# Patient Record
Sex: FEMALE | Race: ASIAN | NOT HISPANIC OR LATINO | URBAN - METROPOLITAN AREA
[De-identification: names, ages, dates, MRNs, and addresses within clinical notes are randomized per-mention and may not be internally consistent; named-entity substitution may affect disease eponyms.]

---

## 2018-06-19 ENCOUNTER — OFFICE VISIT - HEALTHEAST (OUTPATIENT)
Dept: FAMILY MEDICINE | Facility: CLINIC | Age: 63
End: 2018-06-19

## 2018-06-19 DIAGNOSIS — R60.0 LOWER EXTREMITY EDEMA: ICD-10-CM

## 2018-06-19 LAB
ALBUMIN SERPL-MCNC: 3.7 G/DL (ref 3.5–5)
ALP SERPL-CCNC: 127 U/L (ref 45–120)
ALT SERPL W P-5'-P-CCNC: 36 U/L (ref 0–45)
ANION GAP SERPL CALCULATED.3IONS-SCNC: 8 MMOL/L (ref 5–18)
AST SERPL W P-5'-P-CCNC: 23 U/L (ref 0–40)
BASOPHILS # BLD AUTO: 0 THOU/UL (ref 0–0.2)
BASOPHILS NFR BLD AUTO: 0 % (ref 0–2)
BILIRUB SERPL-MCNC: 0.9 MG/DL (ref 0–1)
BNP SERPL-MCNC: 50 PG/ML (ref 0–103)
BUN SERPL-MCNC: 9 MG/DL (ref 8–22)
CALCIUM SERPL-MCNC: 9.5 MG/DL (ref 8.5–10.5)
CHLORIDE BLD-SCNC: 106 MMOL/L (ref 98–107)
CO2 SERPL-SCNC: 25 MMOL/L (ref 22–31)
CREAT SERPL-MCNC: 0.71 MG/DL (ref 0.6–1.1)
D DIMER PPP FEU-MCNC: <0.27 FEU UG/ML
EOSINOPHIL # BLD AUTO: 0.1 THOU/UL (ref 0–0.4)
EOSINOPHIL NFR BLD AUTO: 2 % (ref 0–6)
ERYTHROCYTE [DISTWIDTH] IN BLOOD BY AUTOMATED COUNT: 10.5 % (ref 11–14.5)
GFR SERPL CREATININE-BSD FRML MDRD: >60 ML/MIN/1.73M2
GLUCOSE BLD-MCNC: 146 MG/DL (ref 70–125)
HCT VFR BLD AUTO: 43.6 % (ref 35–47)
HGB BLD-MCNC: 14.5 G/DL (ref 12–16)
LYMPHOCYTES # BLD AUTO: 1.7 THOU/UL (ref 0.8–4.4)
LYMPHOCYTES NFR BLD AUTO: 31 % (ref 20–40)
MCH RBC QN AUTO: 31.3 PG (ref 27–34)
MCHC RBC AUTO-ENTMCNC: 33.3 G/DL (ref 32–36)
MCV RBC AUTO: 94 FL (ref 80–100)
MONOCYTES # BLD AUTO: 0.3 THOU/UL (ref 0–0.9)
MONOCYTES NFR BLD AUTO: 6 % (ref 2–10)
NEUTROPHILS # BLD AUTO: 3.4 THOU/UL (ref 2–7.7)
NEUTROPHILS NFR BLD AUTO: 61 % (ref 50–70)
PLATELET # BLD AUTO: 177 THOU/UL (ref 140–440)
PMV BLD AUTO: 7.6 FL (ref 7–10)
POTASSIUM BLD-SCNC: 4.4 MMOL/L (ref 3.5–5)
PROT SERPL-MCNC: 7.4 G/DL (ref 6–8)
RBC # BLD AUTO: 4.63 MILL/UL (ref 3.8–5.4)
SODIUM SERPL-SCNC: 139 MMOL/L (ref 136–145)
WBC: 5.7 THOU/UL (ref 4–11)

## 2021-06-01 VITALS — WEIGHT: 180.2 LBS

## 2021-06-18 NOTE — PROGRESS NOTES
Subjective:      Patient ID: Indio Garcia is a 62 y.o. female.    Chief Complaint:    Patient's son was her  today.  He is listed as her emergency contact    HPI Indio Garcia is a 62 y.o. female who presents today complaining of bilateral foot swelling x 2 days. She denies this ever happening before. She denies any pain with the swelling. Patient has not noticed anything that makes the swelling worse or better. She denies any hx of HTN, hyperlipidemia, or heart disease. (-) shortness of breath, CP, abdominal pain, fever, or chills. For the past 2 days she has been feeling hungry, but she has had a lack of appetite. She denies any abdominal pain, but has had some nausea. Patient has a PCP in [facility]. She is currently staying in [place] with her son, but plans to return around 7/4/18.     Social History   Substance Use Topics     Smoking status: Never Smoker     Smokeless tobacco: Never Used     Alcohol use None       Review of Systems   Constitutional: Positive for appetite change. Negative for chills and fever.   Respiratory: Negative for shortness of breath.    Cardiovascular: Positive for leg swelling. Negative for chest pain.   Gastrointestinal: Positive for nausea. Negative for abdominal pain, constipation, diarrhea and vomiting.       Objective:     /82 (Patient Site: Right Arm, Patient Position: Sitting, Cuff Size: Adult Regular)  Pulse 61  Temp 98.5  F (36.9  C) (Oral)   Resp 18  Wt 180 lb 3.2 oz (81.7 kg)  SpO2 97%    Physical Exam   Constitutional: She appears well-developed and well-nourished. No distress.   HENT:   Head: Normocephalic and atraumatic.   Right Ear: External ear normal.   Left Ear: External ear normal.   Eyes: Conjunctivae are normal.   Cardiovascular: Normal rate, regular rhythm and normal heart sounds.  Exam reveals no gallop and no friction rub.    No murmur heard.  Pulmonary/Chest: Effort normal and breath sounds normal. No respiratory distress. She has no wheezes. She has  no rales.   Bilateral 1+ pitting edema from foot to mid tibia.    Abdominal: Soft. She exhibits no distension. There is no hepatosplenomegaly. There is tenderness (mild) in the epigastric area. There is no rigidity, no rebound, no guarding, no tenderness at McBurney's point and negative Perez's sign.   Skin: She is not diaphoretic.   Psychiatric: She has a normal mood and affect. Her behavior is normal. Judgment and thought content normal.   Nursing note and vitals reviewed.    Radiology:  I have personally ordered and preliminarily reviewed the following xray, I have noted no signs of cephalization, pleural effusion, or Kerley B-lines indicative of acute HF.    Xr Chest 2 Views    Result Date: 6/19/2018  EXAM DATE:         06/19/2018 [facility] X-RAY CHEST, 2 VIEWS, FRONTAL AND LATERAL 6/19/2018 9:45 AM INDICATION: LE edema bilateral. R/o heart failure COMPARISON: None. FINDINGS: The lungs are clear. Heart is at the upper limits of normal. There is pulmonary vascular redistribution but no signs of failure. Prior cholecystectomy.     Labs:  Recent Results (from the past 24 hour(s))   Comprehensive Metabolic Panel   Result Value Ref Range    Sodium 139 136 - 145 mmol/L    Potassium 4.4 3.5 - 5.0 mmol/L    Chloride 106 98 - 107 mmol/L    CO2 25 22 - 31 mmol/L    Anion Gap, Calculation 8 5 - 18 mmol/L    Glucose 146 (H) 70 - 125 mg/dL    BUN 9 8 - 22 mg/dL    Creatinine 0.71 0.60 - 1.10 mg/dL    GFR MDRD Af Amer >60 >60 mL/min/1.73m2    GFR MDRD Non Af Amer >60 >60 mL/min/1.73m2    Bilirubin, Total 0.9 0.0 - 1.0 mg/dL    Calcium 9.5 8.5 - 10.5 mg/dL    Protein, Total 7.4 6.0 - 8.0 g/dL    Albumin 3.7 3.5 - 5.0 g/dL    Alkaline Phosphatase 127 (H) 45 - 120 U/L    AST 23 0 - 40 U/L    ALT 36 0 - 45 U/L   D-dimer, Quantitative   Result Value Ref Range    D-Dimer, Quant <0.27 <=0.50 FEU ug/mL   HM1 (CBC with Diff)   Result Value Ref Range    WBC 5.7 4.0 - 11.0 thou/uL    RBC 4.63 3.80 - 5.40 mill/uL     Hemoglobin 14.5 12.0 - 16.0 g/dL    Hematocrit 43.6 35.0 - 47.0 %    MCV 94 80 - 100 fL    MCH 31.3 27.0 - 34.0 pg    MCHC 33.3 32.0 - 36.0 g/dL    RDW 10.5 (L) 11.0 - 14.5 %    Platelets 177 140 - 440 thou/uL    MPV 7.6 7.0 - 10.0 fL    Neutrophils % 61 50 - 70 %    Lymphocytes % 31 20 - 40 %    Monocytes % 6 2 - 10 %    Eosinophils % 2 0 - 6 %    Basophils % 0 0 - 2 %    Neutrophils Absolute 3.4 2.0 - 7.7 thou/uL    Lymphocytes Absolute 1.7 0.8 - 4.4 thou/uL    Monocytes Absolute 0.3 0.0 - 0.9 thou/uL    Eosinophils Absolute 0.1 0.0 - 0.4 thou/uL    Basophils Absolute 0.0 0.0 - 0.2 thou/uL   BNP(B-type Natriuretic Peptide)   Result Value Ref Range    BNP 50 0 - 103 pg/mL     Clinical Decision Making:  Acute bilateral lower extremity 1+ pitting edema present on exam today.  D-dimer helped rule out bilateral DVT as it was normal today.  Chest x-ray and BNP were normal today and not indicative of any acute congestive heart failure.  Patient has been asymptomatic of dyspnea upon exertion or chest pain.  She does not have significant history indicating high risk for heart disease.  CMP showed no abnormalities in liver function or kidney function today.  She did not indicate any systemic infection present. I suspect venous dilation leaking likely secondary to summer heat.  Recommend supportive cares such as compression stockings, elevation, and decrease in salt intake.  Patient expressed understanding of these instructions.    45 minutes spent with the patient with greater than 50% of time spent discussing symptoms, treatment options, counseling and/or coordination of care.    Assessment:     Procedures    1. Lower extremity edema  Comprehensive Metabolic Panel    HM1(CBC and Differential)    D-dimer, Quantitative    HM1 (CBC with Diff)    BNP(B-type Natriuretic Peptide)    XR Chest 2 Views    CANCELED: XR Chest 2 Views         Patient Instructions   1. Your chest x-ray does not show any signs of heart failure at this  time.  2.  Your complete blood count was relatively normal today.  You will be notified of your remaining blood tests when they become available.  Depending on the results of these tests will be given further instruction.  3.  I recommend decreasing salt in diet, elevating the legs above heart level when you are able, and wearing compression stockings while you are up and about.  Compression stockings can be found nearby at [employer].    [employer]:  [phone]